# Patient Record
Sex: FEMALE | URBAN - NONMETROPOLITAN AREA
[De-identification: names, ages, dates, MRNs, and addresses within clinical notes are randomized per-mention and may not be internally consistent; named-entity substitution may affect disease eponyms.]

---

## 2022-05-26 ENCOUNTER — OFFICE VISIT (OUTPATIENT)
Dept: URBAN - NONMETROPOLITAN AREA CLINIC 6 | Facility: CLINIC | Age: 67
End: 2022-05-26
Payer: MEDICARE

## 2022-05-26 DIAGNOSIS — E11.9 TYPE 2 DIABETES MELLITUS W/O COMPLICATION: ICD-10-CM

## 2022-05-26 DIAGNOSIS — R51.9 HEADACHE: Primary | ICD-10-CM

## 2022-05-26 DIAGNOSIS — H25.12 AGE-RELATED NUCLEAR CATARACT, LEFT EYE: ICD-10-CM

## 2022-05-26 PROCEDURE — 99204 OFFICE O/P NEW MOD 45 MIN: CPT | Performed by: OPHTHALMOLOGY

## 2022-05-26 RX ORDER — PREDNISONE 20 MG/1
20 MG TABLET ORAL
Qty: 40 | Refills: 0 | Status: ACTIVE
Start: 2022-05-26

## 2022-05-26 RX ORDER — LEVOTHYROXINE SODIUM 13 UG/1
CAPSULE ORAL
Qty: 0 | Refills: 0 | Status: ACTIVE
Start: 2022-05-26

## 2022-05-26 ASSESSMENT — VISUAL ACUITY
OS: 20/25
OD: 20/20

## 2022-05-26 ASSESSMENT — INTRAOCULAR PRESSURE
OS: 12
OD: 10

## 2022-05-26 NOTE — IMPRESSION/PLAN
Impression: Headache: R51.9. Plan: Patient has new onset headache and left temporal tenderness directly over the temporal artery. Patient also has symptom of visual changes OS. Clinical concern for giant cell arteritis. Sed rate was normal yesterday but recent steroid use could impact results. Order CRP and repeat sed rate for further evaluation. Order left temporal artery biopsy with general surgery for further evaluation. Restart prednisone  60mg p.o. daily for 3 days, then reduce to 40mg p.o. daily. [address] to [place] [id number]. Recommend blood sugar management with PCP, patient will call for appointment. Patient is to call if any worsening symptoms.

## 2022-05-26 NOTE — IMPRESSION/PLAN
Impression: Type 2 diabetes mellitus w/o complication: U57.7. Plan: Diabetes type II: no background retinopathy, no signs of neovascularization noted. Discussed ocular and systemic benefits of blood sugar control. See #1 plan above.

## 2022-06-23 ENCOUNTER — OFFICE VISIT (OUTPATIENT)
Dept: URBAN - NONMETROPOLITAN AREA CLINIC 6 | Facility: CLINIC | Age: 67
End: 2022-06-23
Payer: MEDICARE

## 2022-06-23 DIAGNOSIS — H25.12 AGE-RELATED NUCLEAR CATARACT, LEFT EYE: ICD-10-CM

## 2022-06-23 DIAGNOSIS — R51.9 HEADACHE: Primary | ICD-10-CM

## 2022-06-23 PROCEDURE — 99212 OFFICE O/P EST SF 10 MIN: CPT | Performed by: OPHTHALMOLOGY

## 2022-06-23 ASSESSMENT — INTRAOCULAR PRESSURE
OS: 15
OD: 16

## 2022-06-23 NOTE — IMPRESSION/PLAN
Impression: Headache: R51.9. Plan: No ophthalmic etiology for headaches are seen on exam at this time. Recommend further evaluation of headaches with primary care physician. Discussed diagnosis and recommendations with patient in detail. Patient declines help arranging appointment but patient will contact primary care provider for the appointment. Call if any worsening or new symptoms.

## 2022-07-13 ENCOUNTER — OFFICE VISIT (OUTPATIENT)
Dept: URBAN - NONMETROPOLITAN AREA CLINIC 6 | Facility: CLINIC | Age: 67
End: 2022-07-13
Payer: COMMERCIAL

## 2022-07-13 DIAGNOSIS — H52.11 MYOPIA, RIGHT EYE: Primary | ICD-10-CM

## 2022-07-13 PROCEDURE — 92002 INTRM OPH EXAM NEW PATIENT: CPT | Performed by: OPTOMETRIST

## 2022-07-13 ASSESSMENT — INTRAOCULAR PRESSURE
OD: 16
OS: 16

## 2022-07-13 ASSESSMENT — VISUAL ACUITY
OS: 20/20
OD: 20/20

## 2022-07-13 NOTE — IMPRESSION/PLAN
Impression: Myopia, right eye: H52.11. Plan: Discussed adjustment period. Glasses Rx given to patient today. Return to clinic if any changes occur.

## 2023-06-15 ENCOUNTER — OFFICE VISIT (OUTPATIENT)
Dept: URBAN - NONMETROPOLITAN AREA CLINIC 6 | Facility: CLINIC | Age: 68
End: 2023-06-15
Payer: MEDICARE

## 2023-06-15 DIAGNOSIS — E11.9 TYPE 2 DIABETES MELLITUS W/O COMPLICATION: Primary | ICD-10-CM

## 2023-06-15 DIAGNOSIS — H25.12 AGE-RELATED NUCLEAR CATARACT, LEFT EYE: ICD-10-CM

## 2023-06-15 PROCEDURE — 99213 OFFICE O/P EST LOW 20 MIN: CPT | Performed by: OPHTHALMOLOGY

## 2023-06-15 ASSESSMENT — VISUAL ACUITY
OD: 20/25
OS: 20/25

## 2023-06-15 ASSESSMENT — INTRAOCULAR PRESSURE
OS: 14
OD: 11

## 2023-06-15 NOTE — IMPRESSION/PLAN
Impression: Age-related nuclear cataract, left eye: H25.12. Plan: Cataracts account for the patient's complaints. No treatment currently recommended. The patient will monitor vision changes and contact us with any decrease in vision. Refraction prn.

## 2023-06-15 NOTE — IMPRESSION/PLAN
Impression: Type 2 diabetes mellitus w/o complication: U34.7. Plan: Diabetes type II: no background retinopathy, no signs of neovascularization noted. Discussed ocular and systemic benefits of blood sugar control.

## 2023-12-20 ENCOUNTER — OFFICE VISIT (OUTPATIENT)
Dept: URBAN - NONMETROPOLITAN AREA CLINIC 6 | Facility: CLINIC | Age: 68
End: 2023-12-20
Payer: MEDICARE

## 2023-12-20 DIAGNOSIS — H25.12 AGE-RELATED NUCLEAR CATARACT, LEFT EYE: Primary | ICD-10-CM

## 2023-12-20 DIAGNOSIS — E11.9 TYPE 2 DIABETES MELLITUS W/O COMPLICATION: ICD-10-CM

## 2023-12-20 PROCEDURE — 92014 COMPRE OPH EXAM EST PT 1/>: CPT | Performed by: OPTOMETRIST

## 2023-12-20 ASSESSMENT — VISUAL ACUITY
OS: 20/25
OD: 20/25

## 2023-12-20 ASSESSMENT — KERATOMETRY
OS: 45.77
OD: 46.42

## 2023-12-20 ASSESSMENT — INTRAOCULAR PRESSURE
OS: 15
OD: 14

## 2024-12-10 ENCOUNTER — OFFICE VISIT (OUTPATIENT)
Dept: URBAN - NONMETROPOLITAN AREA CLINIC 6 | Facility: CLINIC | Age: 69
End: 2024-12-10
Payer: MEDICARE

## 2024-12-10 DIAGNOSIS — H26.491 OTHER SECONDARY CATARACT, RIGHT EYE: ICD-10-CM

## 2024-12-10 DIAGNOSIS — H43.811 VITREOUS DEGENERATION, RIGHT EYE: ICD-10-CM

## 2024-12-10 DIAGNOSIS — E11.9 TYPE 2 DIABETES MELLITUS W/O COMPLICATION: Primary | ICD-10-CM

## 2024-12-10 DIAGNOSIS — H25.812 COMBINED FORMS OF AGE-RELATED CATARACT, LEFT EYE: ICD-10-CM

## 2024-12-10 DIAGNOSIS — H52.11 MYOPIA, RIGHT EYE: ICD-10-CM

## 2024-12-10 PROCEDURE — 92015 DETERMINE REFRACTIVE STATE: CPT | Performed by: OPTOMETRIST

## 2024-12-10 PROCEDURE — 92014 COMPRE OPH EXAM EST PT 1/>: CPT | Performed by: OPTOMETRIST

## 2024-12-10 ASSESSMENT — VISUAL ACUITY
OD: 20/20
OS: 20/40

## 2024-12-10 ASSESSMENT — INTRAOCULAR PRESSURE
OD: 19
OS: 20

## 2024-12-10 ASSESSMENT — KERATOMETRY
OD: 45.98
OS: 45.80

## 2024-12-12 ENCOUNTER — OFFICE VISIT (OUTPATIENT)
Dept: URBAN - NONMETROPOLITAN AREA CLINIC 6 | Facility: CLINIC | Age: 69
End: 2024-12-12
Payer: MEDICARE

## 2024-12-12 DIAGNOSIS — R51.9 HEADACHE, UNSPECIFIED: ICD-10-CM

## 2024-12-12 DIAGNOSIS — H25.12 AGE-RELATED NUCLEAR CATARACT, LEFT EYE: Primary | ICD-10-CM

## 2024-12-12 PROCEDURE — 99214 OFFICE O/P EST MOD 30 MIN: CPT | Performed by: OPHTHALMOLOGY

## 2024-12-12 RX ORDER — DICLOFENAC SODIUM 1 MG/ML
0.1 % SOLUTION/ DROPS OPHTHALMIC
Qty: 5 | Refills: 1 | Status: ACTIVE
Start: 2024-12-12

## 2024-12-12 ASSESSMENT — INTRAOCULAR PRESSURE
OD: 16
OS: 19

## 2024-12-12 ASSESSMENT — VISUAL ACUITY
OS: 20/40
OD: 20/20

## 2024-12-31 ENCOUNTER — TECH ONLY (OUTPATIENT)
Dept: URBAN - NONMETROPOLITAN AREA CLINIC 6 | Facility: CLINIC | Age: 69
End: 2024-12-31
Payer: MEDICARE

## 2024-12-31 DIAGNOSIS — H25.12 AGE-RELATED NUCLEAR CATARACT, LEFT EYE: Primary | ICD-10-CM

## 2025-01-15 ENCOUNTER — SURGERY (OUTPATIENT)
Dept: URBAN - NONMETROPOLITAN AREA SURGERY 1 | Facility: SURGERY | Age: 70
End: 2025-01-15
Payer: MEDICARE

## 2025-01-15 PROCEDURE — 66984 XCAPSL CTRC RMVL W/O ECP: CPT | Performed by: OPHTHALMOLOGY

## 2025-01-16 ENCOUNTER — POST-OPERATIVE VISIT (OUTPATIENT)
Dept: URBAN - NONMETROPOLITAN AREA CLINIC 6 | Facility: CLINIC | Age: 70
End: 2025-01-16
Payer: MEDICARE

## 2025-01-16 DIAGNOSIS — Z96.1 PRESENCE OF INTRAOCULAR LENS: Primary | ICD-10-CM

## 2025-01-16 PROCEDURE — 99024 POSTOP FOLLOW-UP VISIT: CPT | Performed by: OPHTHALMOLOGY

## 2025-01-16 ASSESSMENT — INTRAOCULAR PRESSURE
OS: 17
OD: 15

## 2025-01-23 ENCOUNTER — POST-OPERATIVE VISIT (OUTPATIENT)
Dept: URBAN - NONMETROPOLITAN AREA CLINIC 6 | Facility: CLINIC | Age: 70
End: 2025-01-23

## 2025-01-23 DIAGNOSIS — Z96.1 PRESENCE OF INTRAOCULAR LENS: Primary | ICD-10-CM

## 2025-01-23 PROCEDURE — 99024 POSTOP FOLLOW-UP VISIT: CPT | Performed by: OPTOMETRIST

## 2025-01-23 ASSESSMENT — INTRAOCULAR PRESSURE: OS: 17

## 2025-01-23 ASSESSMENT — VISUAL ACUITY: OS: 20/20

## 2025-05-29 ENCOUNTER — OFFICE VISIT (OUTPATIENT)
Dept: URBAN - NONMETROPOLITAN AREA CLINIC 6 | Facility: CLINIC | Age: 70
End: 2025-05-29
Payer: MEDICARE

## 2025-05-29 DIAGNOSIS — H02.412 MECHANICAL PTOSIS OF LEFT EYELID: ICD-10-CM

## 2025-05-29 DIAGNOSIS — H26.493 OTHER SECONDARY CATARACT, BILATERAL: Primary | ICD-10-CM

## 2025-05-29 DIAGNOSIS — E11.9 TYPE 2 DIABETES MELLITUS WITHOUT COMPLICATIONS: ICD-10-CM

## 2025-05-29 DIAGNOSIS — H04.123 DRY EYE SYNDROME OF BILATERAL LACRIMAL GLANDS: ICD-10-CM

## 2025-05-29 PROCEDURE — 92014 COMPRE OPH EXAM EST PT 1/>: CPT | Performed by: OPHTHALMOLOGY

## 2025-05-29 PROCEDURE — 92134 CPTRZ OPH DX IMG PST SGM RTA: CPT | Performed by: OPHTHALMOLOGY

## 2025-05-29 RX ORDER — PREDNISOLONE ACETATE 10 MG/ML
1 % SUSPENSION/ DROPS OPHTHALMIC
Qty: 5 | Refills: 0 | Status: ACTIVE
Start: 2025-05-29

## 2025-05-29 ASSESSMENT — INTRAOCULAR PRESSURE
OD: 15
OS: 17

## 2025-05-29 ASSESSMENT — VISUAL ACUITY
OS: 20/25
OD: 20/25

## 2025-05-29 ASSESSMENT — KERATOMETRY: OD: 46.36

## 2025-06-25 ENCOUNTER — OFFICE VISIT (OUTPATIENT)
Dept: URBAN - METROPOLITAN AREA CLINIC 63 | Facility: CLINIC | Age: 70
End: 2025-06-25
Payer: MEDICARE

## 2025-06-25 DIAGNOSIS — H57.813 BROW PTOSIS, BILATERAL: Primary | ICD-10-CM

## 2025-06-25 PROCEDURE — 99214 OFFICE O/P EST MOD 30 MIN: CPT | Performed by: STUDENT IN AN ORGANIZED HEALTH CARE EDUCATION/TRAINING PROGRAM

## 2025-06-25 PROCEDURE — 92285 EXTERNAL OCULAR PHOTOGRAPHY: CPT | Performed by: STUDENT IN AN ORGANIZED HEALTH CARE EDUCATION/TRAINING PROGRAM

## 2025-06-25 PROCEDURE — 92081 LIMITED VISUAL FIELD XM: CPT | Performed by: STUDENT IN AN ORGANIZED HEALTH CARE EDUCATION/TRAINING PROGRAM

## 2025-06-25 ASSESSMENT — INTRAOCULAR PRESSURE
OD: 15
OS: 16

## 2025-08-05 ENCOUNTER — SURGERY (OUTPATIENT)
Dept: URBAN - METROPOLITAN AREA SURGERY 36 | Facility: SURGERY | Age: 70
End: 2025-08-05
Payer: MEDICARE

## 2025-08-05 DIAGNOSIS — D48.5 NEOPLASM OF UNCERTAIN BEHAVIOR OF SKIN: Primary | ICD-10-CM

## 2025-08-14 ENCOUNTER — POST-OPERATIVE VISIT (OUTPATIENT)
Dept: URBAN - METROPOLITAN AREA CLINIC 60 | Facility: CLINIC | Age: 70
End: 2025-08-14

## 2025-08-14 DIAGNOSIS — Z48.89 ENCOUNTER FOR OTHER SPECIFIED SURGICAL AFTERCARE: Primary | ICD-10-CM

## 2025-08-14 PROCEDURE — 99024 POSTOP FOLLOW-UP VISIT: CPT | Performed by: STUDENT IN AN ORGANIZED HEALTH CARE EDUCATION/TRAINING PROGRAM

## 2025-08-14 RX ORDER — AMOXICILLIN AND CLAVULANATE POTASSIUM 875; 125 MG/1; MG/1
TABLET, FILM COATED ORAL
Qty: 14 | Refills: 0 | Status: ACTIVE
Start: 2025-08-14